# Patient Record
Sex: FEMALE | Race: WHITE
[De-identification: names, ages, dates, MRNs, and addresses within clinical notes are randomized per-mention and may not be internally consistent; named-entity substitution may affect disease eponyms.]

---

## 2018-02-08 ENCOUNTER — HOSPITAL ENCOUNTER (OUTPATIENT)
Dept: HOSPITAL 61 - PETSC | Age: 83
Discharge: HOME | End: 2018-02-08
Attending: INTERNAL MEDICINE
Payer: MEDICARE

## 2018-02-08 DIAGNOSIS — D64.9: ICD-10-CM

## 2018-02-08 DIAGNOSIS — C34.91: Primary | ICD-10-CM

## 2018-02-08 DIAGNOSIS — K57.30: ICD-10-CM

## 2018-02-08 DIAGNOSIS — C54.1: ICD-10-CM

## 2018-02-08 PROCEDURE — 78815 PET IMAGE W/CT SKULL-THIGH: CPT

## 2018-02-17 ENCOUNTER — HOSPITAL ENCOUNTER (INPATIENT)
Dept: HOSPITAL 61 - ER | Age: 83
LOS: 1 days | Discharge: HOME | DRG: 313 | End: 2018-02-18
Attending: FAMILY MEDICINE | Admitting: FAMILY MEDICINE
Payer: MEDICARE

## 2018-02-17 DIAGNOSIS — Z87.891: ICD-10-CM

## 2018-02-17 DIAGNOSIS — Z90.49: ICD-10-CM

## 2018-02-17 DIAGNOSIS — Z90.710: ICD-10-CM

## 2018-02-17 DIAGNOSIS — I10: ICD-10-CM

## 2018-02-17 DIAGNOSIS — Z96.652: ICD-10-CM

## 2018-02-17 DIAGNOSIS — Z85.118: ICD-10-CM

## 2018-02-17 DIAGNOSIS — J44.9: ICD-10-CM

## 2018-02-17 DIAGNOSIS — R07.89: Primary | ICD-10-CM

## 2018-02-17 DIAGNOSIS — Z92.21: ICD-10-CM

## 2018-02-17 DIAGNOSIS — M41.9: ICD-10-CM

## 2018-02-17 DIAGNOSIS — Z91.013: ICD-10-CM

## 2018-02-17 DIAGNOSIS — Z86.19: ICD-10-CM

## 2018-02-17 DIAGNOSIS — R79.1: ICD-10-CM

## 2018-02-17 DIAGNOSIS — Z98.42: ICD-10-CM

## 2018-02-17 DIAGNOSIS — Z98.41: ICD-10-CM

## 2018-02-17 DIAGNOSIS — Z85.828: ICD-10-CM

## 2018-02-17 LAB
ADD MAN DIFF?: NO
ALBUMIN SERPL-MCNC: 3.8 G/DL (ref 3.4–5)
ALBUMIN/GLOB SERPL: 0.9 {RATIO} (ref 1–1.7)
ALP SERPL-CCNC: 102 U/L (ref 46–116)
ALT (SGPT): 13 U/L (ref 14–59)
ANION GAP SERPL CALC-SCNC: 12 MMOL/L (ref 6–14)
AST SERPL-CCNC: 15 U/L (ref 15–37)
BASO #: 0.1 X10^3/UL (ref 0–0.2)
BASO %: 1 % (ref 0–3)
BLOOD UREA NITROGEN: 35 MG/DL (ref 7–20)
BUN/CREAT SERPL: 25 (ref 6–20)
CALCIUM: 9.5 MG/DL (ref 8.5–10.1)
CHLORIDE: 101 MMOL/L (ref 98–107)
CK SERPL-CCNC: 88 U/L (ref 26–192)
CKMB INDEX: 1 % (ref 0–4)
CKMB MASS: 0.9 NG/ML (ref 0–3.6)
CO2 SERPL-SCNC: 23 MMOL/L (ref 21–32)
CREAT SERPL-MCNC: 1.4 MG/DL (ref 0.6–1)
D-DIMER: 1.74 UG/MLFEU (ref 0–0.5)
EOS #: 0 X10^3/UL (ref 0–0.7)
EOS %: 0 % (ref 0–3)
GFR SERPLBLD BASED ON 1.73 SQ M-ARVRAT: 35.8 ML/MIN
GLOBULIN SER-MCNC: 4.1 G/DL (ref 2.2–3.8)
GLUCOSE SERPL-MCNC: 109 MG/DL (ref 70–99)
HCG SERPL-ACNC: 9.7 X10^3/UL (ref 4–11)
HEMATOCRIT: 34.8 % (ref 36–47)
HEMOGLOBIN: 11.7 G/DL (ref 12–15.5)
LYMPH #: 0.9 X10^3/UL (ref 1–4.8)
LYMPH %: 9 % (ref 24–48)
MAGNESIUM: 1.8 MG/DL (ref 1.8–2.4)
MEAN CORPUSCULAR HEMOGLOBIN: 33 PG (ref 25–35)
MEAN CORPUSCULAR HGB CONC: 34 G/DL (ref 31–37)
MEAN CORPUSCULAR VOLUME: 98 FL (ref 79–100)
MONO #: 0.7 X10^3/UL (ref 0–1.1)
MONO %: 7 % (ref 0–9)
NEUT #: 8.1 X10^3UL (ref 1.8–7.7)
NEUT %: 83 % (ref 31–73)
PLATELET COUNT: 227 X10^3/UL (ref 140–400)
POTASSIUM SERPL-SCNC: 4.4 MMOL/L (ref 3.5–5.1)
RED BLOOD COUNT: 3.56 X10^6/UL (ref 3.5–5.4)
RED CELL DISTRIBUTION WIDTH: 13.3 % (ref 11.5–14.5)
SODIUM: 136 MMOL/L (ref 136–145)
TOTAL BILIRUBIN: 0.7 MG/DL (ref 0.2–1)
TOTAL PROTEIN: 7.9 G/DL (ref 6.4–8.2)
TROPONINI: < 0.017 NG/ML (ref 0–0.06)
TROPONINI: < 0.017 NG/ML (ref 0–0.06)

## 2018-02-17 PROCEDURE — 93005 ELECTROCARDIOGRAM TRACING: CPT

## 2018-02-17 PROCEDURE — 80048 BASIC METABOLIC PNL TOTAL CA: CPT

## 2018-02-17 PROCEDURE — 85379 FIBRIN DEGRADATION QUANT: CPT

## 2018-02-17 PROCEDURE — 96374 THER/PROPH/DIAG INJ IV PUSH: CPT

## 2018-02-17 PROCEDURE — 84484 ASSAY OF TROPONIN QUANT: CPT

## 2018-02-17 PROCEDURE — 85025 COMPLETE CBC W/AUTO DIFF WBC: CPT

## 2018-02-17 PROCEDURE — 99285 EMERGENCY DEPT VISIT HI MDM: CPT

## 2018-02-17 PROCEDURE — 71045 X-RAY EXAM CHEST 1 VIEW: CPT

## 2018-02-17 PROCEDURE — 94640 AIRWAY INHALATION TREATMENT: CPT

## 2018-02-17 PROCEDURE — 80053 COMPREHEN METABOLIC PANEL: CPT

## 2018-02-17 PROCEDURE — 36415 COLL VENOUS BLD VENIPUNCTURE: CPT

## 2018-02-17 PROCEDURE — 82553 CREATINE MB FRACTION: CPT

## 2018-02-17 PROCEDURE — 78582 LUNG VENTILAT&PERFUS IMAGING: CPT

## 2018-02-17 PROCEDURE — 83735 ASSAY OF MAGNESIUM: CPT

## 2018-02-17 RX ADMIN — IPRATROPIUM BROMIDE AND ALBUTEROL SULFATE 1 ML: .5; 3 SOLUTION RESPIRATORY (INHALATION) at 20:44

## 2018-02-17 RX ADMIN — BACITRACIN 1 MLS/HR: 5000 INJECTION, POWDER, FOR SOLUTION INTRAMUSCULAR at 17:37

## 2018-02-17 RX ADMIN — ACETAMINOPHEN 1 MG: 325 TABLET, FILM COATED ORAL at 20:34

## 2018-02-18 VITALS
DIASTOLIC BLOOD PRESSURE: 75 MMHG | SYSTOLIC BLOOD PRESSURE: 111 MMHG | SYSTOLIC BLOOD PRESSURE: 111 MMHG | DIASTOLIC BLOOD PRESSURE: 75 MMHG | DIASTOLIC BLOOD PRESSURE: 75 MMHG | DIASTOLIC BLOOD PRESSURE: 75 MMHG | SYSTOLIC BLOOD PRESSURE: 111 MMHG | SYSTOLIC BLOOD PRESSURE: 111 MMHG | SYSTOLIC BLOOD PRESSURE: 111 MMHG | DIASTOLIC BLOOD PRESSURE: 75 MMHG | DIASTOLIC BLOOD PRESSURE: 75 MMHG | SYSTOLIC BLOOD PRESSURE: 111 MMHG

## 2018-02-18 LAB
ADD MAN DIFF?: NO
ANION GAP SERPL CALC-SCNC: 11 MMOL/L (ref 6–14)
BASO #: 0 X10^3/UL (ref 0–0.2)
BASO %: 0 % (ref 0–3)
BLOOD UREA NITROGEN: 33 MG/DL (ref 7–20)
CALCIUM: 9.2 MG/DL (ref 8.5–10.1)
CHLORIDE: 105 MMOL/L (ref 98–107)
CO2 SERPL-SCNC: 24 MMOL/L (ref 21–32)
CREAT SERPL-MCNC: 1.4 MG/DL (ref 0.6–1)
EOS #: 0 X10^3/UL (ref 0–0.7)
EOS %: 0 % (ref 0–3)
GFR SERPLBLD BASED ON 1.73 SQ M-ARVRAT: 35.8 ML/MIN
GLUCOSE SERPL-MCNC: 102 MG/DL (ref 70–99)
HCG SERPL-ACNC: 8.2 X10^3/UL (ref 4–11)
HEMATOCRIT: 30.8 % (ref 36–47)
HEMOGLOBIN: 10.5 G/DL (ref 12–15.5)
LYMPH #: 1.2 X10^3/UL (ref 1–4.8)
LYMPH %: 15 % (ref 24–48)
MEAN CORPUSCULAR HEMOGLOBIN: 34 PG (ref 25–35)
MEAN CORPUSCULAR HGB CONC: 34 G/DL (ref 31–37)
MEAN CORPUSCULAR VOLUME: 98 FL (ref 79–100)
MONO #: 0.9 X10^3/UL (ref 0–1.1)
MONO %: 11 % (ref 0–9)
NEUT #: 6.1 X10^3UL (ref 1.8–7.7)
NEUT %: 74 % (ref 31–73)
PLATELET COUNT: 201 X10^3/UL (ref 140–400)
POTASSIUM SERPL-SCNC: 4.4 MMOL/L (ref 3.5–5.1)
RED BLOOD COUNT: 3.14 X10^6/UL (ref 3.5–5.4)
RED CELL DISTRIBUTION WIDTH: 13.6 % (ref 11.5–14.5)
SODIUM: 140 MMOL/L (ref 136–145)
TROPONINI: < 0.017 NG/ML (ref 0–0.06)

## 2018-02-18 RX ADMIN — ACETAMINOPHEN 1 MG: 325 TABLET, FILM COATED ORAL at 06:33

## 2018-02-18 RX ADMIN — LIDOCAINE 1 PATCH: 50 PATCH CUTANEOUS at 14:11

## 2018-02-18 RX ADMIN — IPRATROPIUM BROMIDE AND ALBUTEROL SULFATE 1 ML: .5; 3 SOLUTION RESPIRATORY (INHALATION) at 08:15

## 2018-02-18 RX ADMIN — BACITRACIN 1 MLS/HR: 5000 INJECTION, POWDER, FOR SOLUTION INTRAMUSCULAR at 03:37

## 2018-02-18 RX ADMIN — IPRATROPIUM BROMIDE AND ALBUTEROL SULFATE 1 ML: .5; 3 SOLUTION RESPIRATORY (INHALATION) at 11:59

## 2018-02-18 RX ADMIN — BACITRACIN 1 MLS/HR: 5000 INJECTION, POWDER, FOR SOLUTION INTRAMUSCULAR at 13:37

## 2018-02-18 RX ADMIN — CYANOCOBALAMIN TAB 1000 MCG 1 MCG: 1000 TAB at 14:42

## 2019-03-28 ENCOUNTER — HOSPITAL ENCOUNTER (OUTPATIENT)
Dept: HOSPITAL 61 - PETSC | Age: 84
Discharge: HOME | End: 2019-03-28
Attending: INTERNAL MEDICINE
Payer: MEDICARE

## 2019-03-28 DIAGNOSIS — E04.1: ICD-10-CM

## 2019-03-28 DIAGNOSIS — J98.11: ICD-10-CM

## 2019-03-28 DIAGNOSIS — C34.81: Primary | ICD-10-CM

## 2019-03-28 PROCEDURE — A9552 F18 FDG: HCPCS

## 2019-03-28 PROCEDURE — 78815 PET IMAGE W/CT SKULL-THIGH: CPT

## 2019-03-28 NOTE — RAD
FDG tumor localization scan, PET/CT, 3/28/2019:



History: Follow-up lung cancer



Following IV injection of 15.6 mCi of 18 F-FDG, imaging was performed from the

skull base to the proximal thighs.  The noncontrast CT component was performed

for attenuation correction and anatomic localization purposes rather than for

primary diagnosis.  The patient's blood glucose level at the time of injection

was 117 MG/DL.



Comparison is made to a study from 2/8/2018.



A moderate streaky opacity in the medial aspect of the right upper lobe

appears unchanged on the CT component.  There is low level FDG uptake within

this process demonstrated a maximum SUV of 2.7, unchanged since the previous

study.  This may represent post radiation change/chronic inflammation. No new

pulmonary abnormality is seen.  No hypermetabolic mediastinal lesion is

evident.



There is a small focus of increased activity again noted in the right lobe of

the thyroid gland.  The maximum SUV is 5.5.  This corresponds to a small

nonspecific low-density nodule.  This has been present on multiple previous

studies.  No new neck abnormality is seen.



Normal GI tract and urinary tract activity is present in the abdomen and

pelvis.  No focus of abnormal abdominal or pelvic FDG uptake is seen.



Incidental CT findings include the presence of extensive aortic calcific

plaquing with a few scattered coronary artery calcifications.





IMPRESSION:

1.  Stable right upper lobe atelectasis/consolidation demonstrating low level

FDG uptake.

2.  A small hypermetabolic right thyroid nodule is again noted.

3.  No new FDG/PET abnormality is detected.

## 2021-07-12 ENCOUNTER — HOSPITAL ENCOUNTER (OUTPATIENT)
Dept: HOSPITAL 61 - MAMMO | Age: 86
End: 2021-07-12
Attending: FAMILY MEDICINE
Payer: MEDICARE

## 2021-07-12 DIAGNOSIS — Z12.31: Primary | ICD-10-CM

## 2021-07-12 PROCEDURE — 77067 SCR MAMMO BI INCL CAD: CPT

## 2021-07-12 NOTE — RAD
EXAM: Bilateral screening mammogram.



HISTORY: 87-year-old female presents for screening mammography.



TECHNIQUE: Full-field digital craniocaudal and mediolateral oblique views of both breasts are obtaine
d for evaluation. Computer aided detection was applied.



COMPARISON: There is no recent mammogram for comparison. This exam serves as a new baseline mammogram
.



BREAST PARENCHYMAL DENSITY: Level C - Heterogeneously dense



FINDINGS: There is a circumscribed nodular density within the medial aspect of the left breast at ant
erior to mid depth in the craniocaudal projection. There is no convincing correlate in the mediolater
al oblique projection. There are multiple benign calcifications and areas of benign asymmetry within 
both breasts.



IMPRESSION: BI-RADS Category 0: Incomplete. Additional imaging needed.



RECOMMENDATION: Further evaluation with a full-field true lateral view and spot compression craniocau
elenita view of the left breast and left breast sonogram is recommended to assess nodularity within the m
edial breast at anterior to mid depth.



If your mammogram demonstrates that you have dense breast tissue, which could hide abnormalities, and
 if you have other risk factors for breast cancer that have been identified, you might benefit from s
upplemental screening tests that may be suggested by your ordering physician.  Dense breast tissue, i
n and of itself, is a relatively common condition.  This information is not provided to cause undue c
oncern, but rather to raise your awareness and to promote discussion with your physician regarding th
e presence of other risk factors, in addition to dense breast tissue. A report of your mammography re
sults will be sent to you and your physician.  You should contact your physician if you have any ques
tions or concerns regarding this report.  



Mammography is a sensitive method for finding small breast cancers, but it does not detect them all a
nd is not a substitute for careful clinical examination.  A negative mammogram does not negate a clin
ically suspicious finding and should not result in delay in biopsying a clinically suspicious abnorma
lity.



PQRS compliance statement -  Patient information was entered into a reminder system with a target due
 date for the next mammogram. 



"Our facility is accredited by the [employer] Mammography Program."



Electronically signed by: Mana Weaver MD (7/12/2021 3:48 PM) [id number]

## 2021-07-14 ENCOUNTER — HOSPITAL ENCOUNTER (OUTPATIENT)
Dept: HOSPITAL 61 - MAMMO | Age: 86
End: 2021-07-14
Attending: FAMILY MEDICINE
Payer: MEDICARE

## 2021-07-14 DIAGNOSIS — I51.7: ICD-10-CM

## 2021-07-14 DIAGNOSIS — J43.9: Primary | ICD-10-CM

## 2021-07-14 PROCEDURE — 71046 X-RAY EXAM CHEST 2 VIEWS: CPT

## 2021-07-14 PROCEDURE — 77065 DX MAMMO INCL CAD UNI: CPT

## 2021-07-14 PROCEDURE — 76641 ULTRASOUND BREAST COMPLETE: CPT

## 2021-07-14 NOTE — RAD
EXAM: Left breast diagnostic mammogram; left breast sonogram.



HISTORY: 87-year-old female presents for evaluation of nodularity within the left breast demonstrated
 on a screening mammogram dated 7/12/2021.



TECHNIQUE: Full-field digital true lateral and spot compression craniocaudal views of the left breast
 are obtained. Sonographic imaging of the left breast targeted to the site of mammographic nodularity
 was also performed.



COMPARISON: 7/12/2021



BREAST PARENCHYMAL DENSITY: Level C - Heterogeneously dense.



FINDINGS: There is a persistent circumscribed nodule within the 7:00 position of the left breast at a
nterior to mid depth with additional mammographic views. No associated calcification or architectural
 distortion is seen. 



Sonographic imaging of the left breast demonstrates a 4 mm hypoechoic lesion with internal echoes at 
the 7:00 position 5 cm from nipple, corresponding with the size and location of the mammographic nodu
le of concern. The sonographic appearance favors a complicated cyst or benign fibrocystic etiology.



IMPRESSION: 

1. 4 mm benign-appearing complicated cyst or fibrocystic lesion at the 7:00 position of the left lauren
st 5 cm from the nipple.

2. BI-RADS Category 3: Probably benign finding(s). Short term follow up with a left breast sonogram i
n 6 months is recommended to confirm stability.



if your mammogram demonstrates that you have dense breast tissue, which could hide abnormalities, and
 if you have other risk factors for breast cancer that have been identified, you might benefit from s
upplemental screening tests that may be suggested by your ordering physician.  Dense breast tissue, i
n and of itself, is a relatively common condition.  This information is not provided to cause undue c
oncern, but rather to raise your awareness and to promote discussion with your physician regarding th
e presence of other risk factors, in addition to dense breast tissue. A report of your mammography re
sults will be sent to you and your physician.  You should contact your physician if you have any ques
tions or concerns regarding this report.  



Mammography is a sensitive method for finding small breast cancers, but it does not detect them all a
nd is not a substitute for careful clinical examination.  A negative mammogram does not negate a clin
ically suspicious finding and should not result in delay in biopsying a clinically suspicious abnorma
lity.



PQRS compliance statement -  Patient information was entered into a reminder system with a target due
 date for the next mammogram. 



"Our facility is accredited by the [employer] Mammography Program."



Electronically signed by: Mana Weaver MD (7/14/2021 2:18 PM) [id number]

## 2021-07-14 NOTE — RAD
EXAM: Left breast diagnostic mammogram; left breast sonogram.



HISTORY: 87-year-old female presents for evaluation of nodularity within the left breast demonstrated
 on a screening mammogram dated 7/12/2021.



TECHNIQUE: Full-field digital true lateral and spot compression craniocaudal views of the left breast
 are obtained. Sonographic imaging of the left breast targeted to the site of mammographic nodularity
 was also performed.



COMPARISON: 7/12/2021



BREAST PARENCHYMAL DENSITY: Level C - Heterogeneously dense.



FINDINGS: There is a persistent circumscribed nodule within the 7:00 position of the left breast at a
nterior to mid depth with additional mammographic views. No associated calcification or architectural
 distortion is seen. 



Sonographic imaging of the left breast demonstrates a 4 mm hypoechoic lesion with internal echoes at 
the 7:00 position 5 cm from nipple, corresponding with the size and location of the mammographic nodu
le of concern. The sonographic appearance favors a complicated cyst or benign fibrocystic etiology.



IMPRESSION: 

1. 4 mm benign-appearing complicated cyst or fibrocystic lesion at the 7:00 position of the left lauren
st 5 cm from the nipple.

2. BI-RADS Category 3: Probably benign finding(s). Short term follow up with a left breast sonogram i
n 6 months is recommended to confirm stability.



if your mammogram demonstrates that you have dense breast tissue, which could hide abnormalities, and
 if you have other risk factors for breast cancer that have been identified, you might benefit from s
upplemental screening tests that may be suggested by your ordering physician.  Dense breast tissue, i
n and of itself, is a relatively common condition.  This information is not provided to cause undue c
oncern, but rather to raise your awareness and to promote discussion with your physician regarding th
e presence of other risk factors, in addition to dense breast tissue. A report of your mammography re
sults will be sent to you and your physician.  You should contact your physician if you have any ques
tions or concerns regarding this report.  



Mammography is a sensitive method for finding small breast cancers, but it does not detect them all a
nd is not a substitute for careful clinical examination.  A negative mammogram does not negate a clin
ically suspicious finding and should not result in delay in biopsying a clinically suspicious abnorma
lity.



PQRS compliance statement -  Patient information was entered into a reminder system with a target due
 date for the next mammogram. 



"Our facility is accredited by the [employer] Mammography Program."



Electronically signed by: Mana Weaver MD (7/14/2021 2:18 PM) [id number]

## 2021-07-14 NOTE — RAD
EXAM: Chest, 2 views.



HISTORY: Shortness of breath.



COMPARISON: 2/17/2018



FINDINGS: 2 views of the chest are obtained. There is stable right suprahilar linear right suprahilar
 opacity and architectural distortion. There is a stable prominent cardiac silhouette. There is no in
filtrate or pleural effusion. There is no pneumothorax. There is hyperinflation likely due to emphyse
ma.



IMPRESSION: 

1. Stable linear right suprahilar opacity and architectural distortion likely due to post treatment c
hanges in this patient with a history of lung cancer.

2. Emphysema.

3. Cardiomegaly.



Electronically signed by: Mana Weaver MD (7/14/2021 1:14 PM) [id number]

## 2021-07-28 ENCOUNTER — HOSPITAL ENCOUNTER (OUTPATIENT)
Dept: HOSPITAL 61 - ONCLAB | Age: 86
End: 2021-07-28
Attending: INTERNAL MEDICINE
Payer: MEDICARE

## 2021-07-28 DIAGNOSIS — Z85.850: Primary | ICD-10-CM

## 2021-07-28 LAB
ALBUMIN SERPL-MCNC: 3.9 G/DL (ref 3.4–5)
ALBUMIN/GLOB SERPL: 1 {RATIO} (ref 1–1.7)
ALP SERPL-CCNC: 93 U/L (ref 46–116)
ALT SERPL-CCNC: 17 U/L (ref 14–59)
ANION GAP SERPL CALC-SCNC: 9 MMOL/L (ref 6–14)
AST SERPL-CCNC: 17 U/L (ref 15–37)
BASOPHILS # BLD AUTO: 0 X10^3/UL (ref 0–0.2)
BASOPHILS NFR BLD: 1 % (ref 0–3)
BILIRUB SERPL-MCNC: 0.4 MG/DL (ref 0.2–1)
BUN SERPL-MCNC: 45 MG/DL (ref 7–20)
BUN/CREAT SERPL: 25 (ref 6–20)
CALCIUM SERPL-MCNC: 9.7 MG/DL (ref 8.5–10.1)
CHLORIDE SERPL-SCNC: 101 MMOL/L (ref 98–107)
CO2 SERPL-SCNC: 25 MMOL/L (ref 21–32)
CREAT SERPL-MCNC: 1.8 MG/DL (ref 0.6–1)
EOSINOPHIL NFR BLD: 0.1 X10^3/UL (ref 0–0.7)
EOSINOPHIL NFR BLD: 1 % (ref 0–3)
ERYTHROCYTE [DISTWIDTH] IN BLOOD BY AUTOMATED COUNT: 14 % (ref 11.5–14.5)
GFR SERPLBLD BASED ON 1.73 SQ M-ARVRAT: 26.6 ML/MIN
GLUCOSE SERPL-MCNC: 101 MG/DL (ref 70–99)
HCT VFR BLD CALC: 33.6 % (ref 36–47)
HGB BLD-MCNC: 11.2 G/DL (ref 12–15.5)
LYMPHOCYTES # BLD: 1.2 X10^3/UL (ref 1–4.8)
LYMPHOCYTES NFR BLD AUTO: 19 % (ref 24–48)
MCH RBC QN AUTO: 33 PG (ref 25–35)
MCHC RBC AUTO-ENTMCNC: 33 G/DL (ref 31–37)
MCV RBC AUTO: 99 FL (ref 79–100)
MONO #: 0.4 X10^3/UL (ref 0–1.1)
MONOCYTES NFR BLD: 6 % (ref 0–9)
NEUT #: 4.6 X10^3/UL (ref 1.8–7.7)
NEUTROPHILS NFR BLD AUTO: 74 % (ref 31–73)
PLATELET # BLD AUTO: 231 X10^3/UL (ref 140–400)
POTASSIUM SERPL-SCNC: 4.9 MMOL/L (ref 3.5–5.1)
PROT SERPL-MCNC: 8 G/DL (ref 6.4–8.2)
RBC # BLD AUTO: 3.4 X10^6/UL (ref 3.5–5.4)
SODIUM SERPL-SCNC: 135 MMOL/L (ref 136–145)
T4 FREE SERPL-MCNC: 1.12 NG/DL (ref 0.76–1.46)
THYROID STIM HORMONE (TSH): 0.73 UIU/ML (ref 0.36–3.74)
WBC # BLD AUTO: 6.2 X10^3/UL (ref 4–11)

## 2021-07-28 PROCEDURE — 84443 ASSAY THYROID STIM HORMONE: CPT

## 2021-07-28 PROCEDURE — 85025 COMPLETE CBC W/AUTO DIFF WBC: CPT

## 2021-07-28 PROCEDURE — 36415 COLL VENOUS BLD VENIPUNCTURE: CPT

## 2021-07-28 PROCEDURE — 80053 COMPREHEN METABOLIC PANEL: CPT

## 2021-07-28 PROCEDURE — 86800 THYROGLOBULIN ANTIBODY: CPT

## 2021-07-28 PROCEDURE — 84439 ASSAY OF FREE THYROXINE: CPT

## 2021-08-11 ENCOUNTER — HOSPITAL ENCOUNTER (OUTPATIENT)
Dept: HOSPITAL 61 - CT | Age: 86
End: 2021-08-11
Attending: INTERNAL MEDICINE
Payer: MEDICARE

## 2021-08-11 DIAGNOSIS — K57.30: ICD-10-CM

## 2021-08-11 DIAGNOSIS — Z85.118: ICD-10-CM

## 2021-08-11 DIAGNOSIS — N28.1: ICD-10-CM

## 2021-08-11 DIAGNOSIS — J84.10: ICD-10-CM

## 2021-08-11 DIAGNOSIS — E04.2: ICD-10-CM

## 2021-08-11 DIAGNOSIS — Z85.850: ICD-10-CM

## 2021-08-11 DIAGNOSIS — I35.8: ICD-10-CM

## 2021-08-11 DIAGNOSIS — R91.8: ICD-10-CM

## 2021-08-11 DIAGNOSIS — C44.42: Primary | ICD-10-CM

## 2021-08-11 PROCEDURE — 74176 CT ABD & PELVIS W/O CONTRAST: CPT

## 2021-08-11 PROCEDURE — 76536 US EXAM OF HEAD AND NECK: CPT

## 2021-08-11 PROCEDURE — 71250 CT THORAX DX C-: CPT

## 2021-08-11 NOTE — RAD
US THYROID



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 Instr
uctions:  / History: 



Comparison:  None.



Technique: Multiple grayscale and color Doppler images of the thyroid gland were obtained.



Findings: 

Right thyroid lobe: 5.3 x 1.7 x 2.2 cm. Heterogeneous echotexture.

Left thyroid lobe: 4.2 x 1.9 x 1.7 cm. Heterogeneous echotexture.

Isthmus: 0.5 cm.

 

-Right superior thyroid spongiform nodule measures 0.9 x 0.8 x 0.5 cm. TI-RADS 2.



-Solid hypoechoic right superior thyroid nodule measures 1.0 x 1.1 x 0.7 cm. TI-RADS 4



-Solid isoechoic left superior thyroid nodule measures 0.7 x 0.8 x 0.6 cm. TI-RADS 3.



Solid hypoechoic left lateral thyroid nodule measures 1.1 x 0.9 x 0.7 cm with punctate calcifications
. TI-RADS 5.



ACR Thyroid Imaging, Reporting And Data System (TI-RADS): White Paper Of The ACR TI-RADS Committee. J
ournal of the American College of Radiology, volume 14, issue 5, pages 587-595 (May 2017).



IMPRESSION:

1.  TI-RADS 5 left inferior thyroid nodule. Recommend further evaluation as clinically warranted with
 treatment or fine needle aspiration if indicated. Comparison with more recent prior examinations wou
ld be of benefit.

2.  Additional TI-RADS 3 and TI-RADS 4 thyroid nodules.

3.  Diffusely heterogeneous thyroid, may relate to prior treatment.







Electronically signed by: Nathan Judge DO (8/11/2021 5:03 PM) [id number]

## 2021-08-11 NOTE — RAD
EXAM: CT OF THE CHEST, ABDOMEN AND PELVIS WITH CONTRAST.



HISTORY: Lung cancer.



TECHNIQUE: Computed tomography of the chest, abdomen and pelvis was performed after the intravenous a
dministration of iodinated contrast. One or more of the following individualized dose reduction techn
iques were utilized for this examination:  

1. Automated exposure control.  

2. Adjustment of the mA and/or kV according to patient size.  

3. Use of iterative reconstruction technique.



COMPARISON: 03/28/2019.



FINDINGS: Bone windows reveal no suspicious lesions. A sclerotic focus lesion within the left intertr
ochanteric femur is stable chronically. There is a moderate thoracolumbar levoscoliosis.



There are no pathologically enlarged mediastinal or axillary lymph nodes. There is no pleural or juan
cardial effusion. The heart is not enlarged. There are calcifications of the aortic valve coronary ar
teries.



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 An unc
alcified nodule in the left lower lobe on image 44 measures 6 x 5 mm and is stable. Several additiona
l tiny pulmonary nodules measure 4 mm or less. All are stable. No new nodules are seen.



The liver, pancreas, spleen, gallbladder, and adrenal glands are unremarkable without contrast. Bilat
eral renal cysts appear benign and measures up to 2 cm on the right. There are no pathologically enla
rged lymph nodes.



A cyst in the right adnexa measures 4.6 x 2.6 cm. This is slightly increased from 3.8 x 2.0 cm in 201
9. Sigmoid diverticulosis is moderate. The appendix is not inflamed. There is no small bowel obstruct
ion.



IMPRESSION:

1. Stable posttreatment changes in the right upper lobe. No evidence of recurrent or metastatic disea
se.

2. Small pulmonary nodules measure 6 mm or less and are stable chronically. These are likely benign.

3. A 4.6 cm cystic lesion in the right adnexa consistent slowly since 2019. No pelvic ultrasound coul
d further evaluate if there is persistent concern.

4. Aortic valve calcifications. Correlate for aortic stenosis.



Electronically signed by: GALDINO Erazo MD (8/11/2021 5:33 PM) [id number]

## 2021-10-01 ENCOUNTER — HOSPITAL ENCOUNTER (OUTPATIENT)
Dept: HOSPITAL 61 - CT | Age: 86
End: 2021-10-01
Attending: INTERNAL MEDICINE
Payer: MEDICARE

## 2021-10-01 DIAGNOSIS — N28.89: ICD-10-CM

## 2021-10-01 DIAGNOSIS — I51.7: ICD-10-CM

## 2021-10-01 DIAGNOSIS — I70.0: ICD-10-CM

## 2021-10-01 DIAGNOSIS — K57.30: ICD-10-CM

## 2021-10-01 DIAGNOSIS — N83.8: ICD-10-CM

## 2021-10-01 DIAGNOSIS — R91.1: Primary | ICD-10-CM

## 2021-10-01 DIAGNOSIS — Z85.118: ICD-10-CM

## 2021-10-01 PROCEDURE — 74176 CT ABD & PELVIS W/O CONTRAST: CPT

## 2021-10-01 NOTE — RAD
CT scan of the abdomen and pelvis with oral contrast only 10/1/2021



CLINICAL HISTORY: Epigastric pain.



TECHNIQUE: After the oral administration contrast only, contiguous, 5 mm axial sections were obtained
 through the abdomen and pelvis.



One or more of the following individualized dose reduction techniques were utilized for this study:



1. Automated exposure control.

2. Adjustment of the mA and/or kV according to patient size.

3. Use of iterative reconstruction technique.



FINDINGS: Comparison study is dated 8/11/2021.



Images through the lung bases demonstrate mild cardiomegaly. A 4 mm nodular opacity is seen involving
 the right lower lobe which is unchanged.



The liver, spleen, pancreas, and adrenal glands are within normal limits. Rounded low-attenuation les
ions are seen involving both kidneys which measure 1 to 2 cm in size. These likely represent cysts. N
o further imaging evaluation is recommended.



Atherosclerotic calcification abdominal aorta is seen. The abdominal aorta tapers normally. The appen
devante is well-visualized and is within normal limits. The gallbladder is slightly contracted. No free f
luid or free air is within the abdomen. There is no evidence of bowel obstruction.



Images through the pelvis demonstrated the urinary bladder distended with urine. Calcifications are s
een within the pelvis consistent with phleboliths. A 4.6 cm oval-shaped low-attenuation lesion is see
n in the right adnexa, unchanged. This may represent a right ovarian cyst. No free fluid is noted. A 
moderate amount stool seen involving the rectum and sigmoid colon. Patient appears to be post hystere
ctomy. Multiple diverticula are seen involving the sigmoid colon. No inflammatory changes are seen ad
jacent fat. The osseous structures are unchanged.



IMPRESSION: No acute abnormality is seen.



Electronically signed by: Fox Hanna MD (10/1/2021 5:57 PM) [id number]